# Patient Record
Sex: FEMALE | Race: WHITE | NOT HISPANIC OR LATINO | Employment: FULL TIME | URBAN - METROPOLITAN AREA
[De-identification: names, ages, dates, MRNs, and addresses within clinical notes are randomized per-mention and may not be internally consistent; named-entity substitution may affect disease eponyms.]

---

## 2017-08-04 ENCOUNTER — OFFICE VISIT (OUTPATIENT)
Dept: URGENT CARE | Facility: CLINIC | Age: 57
End: 2017-08-04
Payer: COMMERCIAL

## 2017-08-04 VITALS
OXYGEN SATURATION: 98 % | BODY MASS INDEX: 29.23 KG/M2 | WEIGHT: 165 LBS | HEIGHT: 63 IN | DIASTOLIC BLOOD PRESSURE: 90 MMHG | TEMPERATURE: 98 F | HEART RATE: 89 BPM | SYSTOLIC BLOOD PRESSURE: 128 MMHG

## 2017-08-04 DIAGNOSIS — S30.0XXA COCCYX CONTUSION, INITIAL ENCOUNTER: ICD-10-CM

## 2017-08-04 DIAGNOSIS — S20.229A BACK CONTUSION, UNSPECIFIED LATERALITY, INITIAL ENCOUNTER: Primary | ICD-10-CM

## 2017-08-04 PROCEDURE — 99203 OFFICE O/P NEW LOW 30 MIN: CPT | Mod: S$GLB,,,

## 2017-08-04 PROCEDURE — 3008F BODY MASS INDEX DOCD: CPT | Mod: S$GLB,,,

## 2017-08-04 NOTE — PROGRESS NOTES
"Subjective:       Patient ID: Claudia Martínez is a 57 y.o. female.    Chief Complaint: Fall    NEW INJURY (DOI 7/24/2017) PT REPORTS INSPECTING A WATER LAB WHEN SHE STEPPED BACK HER FOOT HIT A STEP CAUSING HER TO LOSE HER BALANCE. PT FELL BACK INTO A SITTING POSITION ON HER PURSE. PT STATES SHE WAS HOLDING A CAMERA, SO SHE REMEMBERS CLUTCHING IT REALLY TIGHT TO PROTECT IT FROM FALLING. PT REPORTS NOT FEELING ANY EFFECTS UNTIL YESTERDAY. SHE C/O BILATERAL ARMS AND LEGS, "WEIRD TIGHT" SENSATIONS, AND CENTER LOW BACK DISCOMFORT. SHE DENIES PAIN. SHE STATES IT'S MORE OF AN "IRRITATION" Injury occurred on July 24, symptoms started in past 2 days. Asymptomatic today except for tailbone. Past history of L 4-5 disc herniation cared for by Dr Diana in 2007. No LBP since then.      Fall   The accident occurred more than 1 week ago. The fall occurred while walking. She fell from an unknown height. She landed on hard floor. There was no blood loss. The point of impact was the buttocks. The pain is present in the left upper arm, right upper arm, left upper leg, right upper leg and back. The pain is at a severity of 0/10. The patient is experiencing no pain. The symptoms are aggravated by sitting. Pertinent negatives include no abdominal pain, bowel incontinence, hematuria or numbness. She has tried NSAID for the symptoms. The treatment provided moderate relief.     Review of Systems   Constitution: Negative for malaise/fatigue.   Skin: Negative for rash.   Musculoskeletal: Positive for back pain and muscle cramps. Negative for muscle weakness and stiffness.   Gastrointestinal: Negative for abdominal pain and bowel incontinence.   Genitourinary: Negative for bladder incontinence, dysuria, hematuria and urgency.   Neurological: Negative for disturbances in coordination and numbness.       Objective:      Physical Exam   Constitutional: She is oriented to person, place, and time. She appears well-developed and well-nourished. She is " cooperative.  Non-toxic appearance. She does not appear ill. No distress.   HENT:   Head: Normocephalic and atraumatic.   Right Ear: Hearing, tympanic membrane, external ear and ear canal normal.   Left Ear: Hearing, tympanic membrane, external ear and ear canal normal.   Nose: Nose normal. No mucosal edema, rhinorrhea or nasal deformity. No epistaxis. Right sinus exhibits no maxillary sinus tenderness and no frontal sinus tenderness. Left sinus exhibits no maxillary sinus tenderness and no frontal sinus tenderness.   Mouth/Throat: Uvula is midline, oropharynx is clear and moist and mucous membranes are normal. No trismus in the jaw. Normal dentition. No uvula swelling. No posterior oropharyngeal erythema.   Eyes: Conjunctivae and lids are normal. Right eye exhibits no discharge. Left eye exhibits no discharge. No scleral icterus.   Sclera clear bilat   Neck: Trachea normal, normal range of motion, full passive range of motion without pain and phonation normal. Neck supple.   Cardiovascular: Normal rate, regular rhythm, normal heart sounds, intact distal pulses and normal pulses.    Pulmonary/Chest: Effort normal and breath sounds normal. No respiratory distress.   Abdominal: Soft. Normal appearance and bowel sounds are normal. She exhibits no distension, no pulsatile midline mass and no mass. There is no tenderness.   Musculoskeletal: Normal range of motion. She exhibits no edema or deformity.        Lumbar back: She exhibits tenderness (Tender coccyx). She exhibits no bony tenderness, no swelling, no edema and no spasm.   ROM normal:  Flex  Ext  Rotation R and L  Side bending R and L  Neg Spurling's sign    Sensory intact to pin prick  Motor 5/5 bilaterally  DTR's 2+ biceps, triceps    Full ROM in Flex, ext, R and   Neg SLR bilaterally  Neg Emily bilaterally  Able to sit up and reverse sit up    DTR's 2+ bilaterally in the Patellar and Achilles Tendon areas  Sensory exam intact to pin prick bilaterally  Motor exam  5/5 bilaterally    Waddells:    Tenderness Neg  Simulation Neg  Distraction Neg  Regional Neg  Overreaction Neg   Neurological: She is alert and oriented to person, place, and time. She exhibits normal muscle tone. Coordination normal.   Skin: Skin is warm, dry and intact. She is not diaphoretic. No pallor.   Psychiatric: She has a normal mood and affect. Her speech is normal and behavior is normal. Judgment and thought content normal. Cognition and memory are normal.   Nursing note and vitals reviewed.      Assessment:       1. Back contusion, unspecified laterality, initial encounter    2. Coccyx contusion, initial encounter        Plan:       Warm soaks  Follow up as needed  OTC Ibuprofen as needed

## 2017-08-07 ENCOUNTER — OFFICE VISIT (OUTPATIENT)
Dept: URGENT CARE | Facility: CLINIC | Age: 57
End: 2017-08-07
Payer: COMMERCIAL

## 2017-08-07 VITALS
SYSTOLIC BLOOD PRESSURE: 128 MMHG | OXYGEN SATURATION: 98 % | HEART RATE: 97 BPM | TEMPERATURE: 98 F | DIASTOLIC BLOOD PRESSURE: 78 MMHG

## 2017-08-07 DIAGNOSIS — S20.229D BACK CONTUSION, UNSPECIFIED LATERALITY, SUBSEQUENT ENCOUNTER: Primary | ICD-10-CM

## 2017-08-07 DIAGNOSIS — M47.816 LUMBAR SPONDYLOSIS: ICD-10-CM

## 2017-08-07 PROCEDURE — 3008F BODY MASS INDEX DOCD: CPT | Mod: S$GLB,,,

## 2017-08-07 PROCEDURE — 99213 OFFICE O/P EST LOW 20 MIN: CPT | Mod: S$GLB,,,

## 2017-08-07 RX ORDER — NAPROXEN 500 MG/1
500 TABLET ORAL 2 TIMES DAILY WITH MEALS
Qty: 30 TABLET | Refills: 2 | Status: SHIPPED | OUTPATIENT
Start: 2017-08-07 | End: 2018-08-07

## 2017-08-07 NOTE — PROGRESS NOTES
Subjective:       Patient ID: Claudia Martínez is a 57 y.o. female.    Chief Complaint: Back Pain    F/U LOW BACK PAIN (DOI 7/24/2017). Woke up with spasms in lower back and coccygeal area. Took Ibuprofen with some relief.       Back Pain   The current episode started more than 1 month ago. The problem occurs constantly. The problem is unchanged. The pain is present in the lumbar spine. The quality of the pain is described as aching. The pain is mild. The pain is worse during the night. Pertinent negatives include no abdominal pain, bladder incontinence, bowel incontinence, dysuria or numbness.     Review of Systems   Constitution: Negative for malaise/fatigue.   Skin: Negative for rash.   Musculoskeletal: Positive for back pain. Negative for muscle cramps, muscle weakness and stiffness.   Gastrointestinal: Negative for abdominal pain and bowel incontinence.   Genitourinary: Negative for bladder incontinence, dysuria, hematuria and urgency.   Neurological: Negative for disturbances in coordination and numbness.       Objective:      Physical Exam   Constitutional: She is oriented to person, place, and time. She appears well-developed and well-nourished. She is cooperative.  Non-toxic appearance. She does not appear ill. No distress.   HENT:   Head: Normocephalic and atraumatic.   Right Ear: Hearing, tympanic membrane, external ear and ear canal normal.   Left Ear: Hearing, tympanic membrane, external ear and ear canal normal.   Nose: Nose normal. No mucosal edema, rhinorrhea or nasal deformity. No epistaxis. Right sinus exhibits no maxillary sinus tenderness and no frontal sinus tenderness. Left sinus exhibits no maxillary sinus tenderness and no frontal sinus tenderness.   Mouth/Throat: Uvula is midline, oropharynx is clear and moist and mucous membranes are normal. No trismus in the jaw. Normal dentition. No uvula swelling. No posterior oropharyngeal erythema.   Eyes: Conjunctivae and lids are normal. Right eye  exhibits no discharge. Left eye exhibits no discharge. No scleral icterus.   Sclera clear bilat   Neck: Trachea normal, normal range of motion, full passive range of motion without pain and phonation normal. Neck supple.   Cardiovascular: Normal rate, regular rhythm, normal heart sounds, intact distal pulses and normal pulses.    Pulmonary/Chest: Effort normal and breath sounds normal. No respiratory distress.   Abdominal: Soft. Normal appearance and bowel sounds are normal. She exhibits no distension, no pulsatile midline mass and no mass. There is no tenderness.   Musculoskeletal: Normal range of motion. She exhibits no edema or deformity.   Neurological: She is alert and oriented to person, place, and time. She exhibits normal muscle tone. Coordination normal.   Skin: Skin is warm, dry and intact. She is not diaphoretic. No pallor.   Psychiatric: She has a normal mood and affect. Her speech is normal and behavior is normal. Judgment and thought content normal. Cognition and memory are normal.   Nursing note and vitals reviewed.      Assessment:       1. Back contusion, unspecified laterality, subsequent encounter    2. Lumbar spondylosis        Plan:           Medications Ordered This Encounter      naproxen (NAPROSYN) 500 MG tablet          Sig: Take 1 tablet (500 mg total) by mouth 2 (two) times daily with meals.          Dispense:  30 tablet          Refill:  2      Restrictions: Sedentary work only, Avoid frequent bending/lifting/twisting, Avoid climbing/kneeling/squatting

## 2017-08-14 ENCOUNTER — OFFICE VISIT (OUTPATIENT)
Dept: URGENT CARE | Facility: CLINIC | Age: 57
End: 2017-08-14
Payer: COMMERCIAL

## 2017-08-14 DIAGNOSIS — S20.229D BACK CONTUSION, UNSPECIFIED LATERALITY, SUBSEQUENT ENCOUNTER: Primary | ICD-10-CM

## 2017-08-14 DIAGNOSIS — M47.816 LUMBAR SPONDYLOSIS: ICD-10-CM

## 2017-08-14 PROCEDURE — 3008F BODY MASS INDEX DOCD: CPT | Mod: S$GLB,,, | Performed by: PREVENTIVE MEDICINE

## 2017-08-14 PROCEDURE — 99213 OFFICE O/P EST LOW 20 MIN: CPT | Mod: S$GLB,,, | Performed by: PREVENTIVE MEDICINE

## 2017-08-14 NOTE — PROGRESS NOTES
Subjective:       Patient ID: Claudia Martínez is a 57 y.o. female.    Chief Complaint: Back Pain (no pain)    Patient states that she has no pain/spasms or discomfort      Back Pain   This is a new problem. The current episode started 1 to 4 weeks ago. The pain is at a severity of 0/10. The patient is experiencing no pain. Pertinent negatives include no abdominal pain, bladder incontinence, chest pain, dysuria, fever, headaches, numbness or weakness. The treatment provided significant relief.     Review of Systems   Constitution: Negative for chills, fever and weakness.   HENT: Negative for congestion, headaches and sore throat.    Eyes: Negative for blurred vision and pain.   Cardiovascular: Negative for chest pain, palpitations and syncope.   Respiratory: Negative for cough, shortness of breath and wheezing.    Skin: Negative for dry skin, itching and rash.   Musculoskeletal: Positive for back pain. Negative for muscle weakness and stiffness.   Gastrointestinal: Negative for abdominal pain, constipation, diarrhea, nausea and vomiting.   Genitourinary: Negative for bladder incontinence, dysuria and hematuria.   Neurological: Negative for dizziness and numbness.       Objective:      Physical Exam    Assessment:       No diagnosis found.    Plan:                   ***

## 2017-08-14 NOTE — LETTER
[facility] [place]  [address], [address]  [place] [address]  Phone: [phone]  Fax: [phone]    Pt Name: Claudia Martínez     Employee ID:  Date of First Treatment: 08/14/2017   Company: [employer]            Appointment Time: 02:15 PM Arrived:  2:30 PM CDT   Physician: Nain Bryant MD Time Out: 10:00 AM           Office Treatment: Claudia was seen today for back pain.    Diagnoses and all orders for this visit:    Back contusion, unspecified laterality, subsequent encounter    Lumbar spondylosis       Patient Instructions: Daily home exercises/warm soaks    Restrictions: Regular Duty, Discharged from Occupational Health       Return Appointment: Discharged

## 2017-08-14 NOTE — PROGRESS NOTES
Subjective:       Patient ID: Claudia Martínez is a 57 y.o. female.    Chief Complaint: Back Pain (no pain)    Patient has no complaints of back pain/spasms or discomfort      Back Pain   This is a new problem. The current episode started 1 to 4 weeks ago. The pain is at a severity of 0/10. The patient is experiencing no pain. The treatment provided significant relief.     Review of Systems   Musculoskeletal: Positive for back pain.   All other systems reviewed and are negative.      Objective:      Physical Exam   Constitutional: She appears well-developed and well-nourished.   HENT:   Head: Normocephalic.   Eyes: Pupils are equal, round, and reactive to light.   Neck: Normal range of motion.   Cardiovascular: Normal rate.    Pulmonary/Chest: Effort normal.   Musculoskeletal:        Lumbar back: She exhibits normal range of motion, no tenderness, no bony tenderness, no swelling, no edema, no deformity, no laceration, no pain, no spasm and normal pulse.   Neurological: She is alert.   No focal neurologic deficits   Skin: Skin is warm.   Psychiatric: She has a normal mood and affect.   Nursing note and vitals reviewed.      Assessment:       1. Back contusion, unspecified laterality, subsequent encounter    2. Lumbar spondylosis        Plan:            Patient Instructions: Daily home exercises/warm soaks   Restrictions: Regular Duty, Discharged from Occupational Health      Return if symptoms worsen or fail to improve.

## 2017-11-15 ENCOUNTER — OFFICE VISIT (OUTPATIENT)
Dept: URGENT CARE | Facility: CLINIC | Age: 57
End: 2017-11-15
Payer: COMMERCIAL

## 2017-11-15 VITALS
RESPIRATION RATE: 18 BRPM | DIASTOLIC BLOOD PRESSURE: 78 MMHG | HEIGHT: 63 IN | WEIGHT: 170 LBS | TEMPERATURE: 99 F | HEART RATE: 83 BPM | SYSTOLIC BLOOD PRESSURE: 122 MMHG | BODY MASS INDEX: 30.12 KG/M2 | OXYGEN SATURATION: 98 %

## 2017-11-15 DIAGNOSIS — J32.9 SINUSITIS, UNSPECIFIED CHRONICITY, UNSPECIFIED LOCATION: Primary | ICD-10-CM

## 2017-11-15 PROCEDURE — 99203 OFFICE O/P NEW LOW 30 MIN: CPT | Mod: S$GLB,,, | Performed by: FAMILY MEDICINE

## 2017-11-15 RX ORDER — METHYLPREDNISOLONE 4 MG/1
TABLET ORAL
Qty: 1 PACKAGE | Refills: 0 | Status: SHIPPED | OUTPATIENT
Start: 2017-11-15 | End: 2017-11-21

## 2017-11-15 RX ORDER — AZITHROMYCIN 250 MG/1
TABLET, FILM COATED ORAL
Qty: 6 TABLET | Refills: 0 | Status: SHIPPED | OUTPATIENT
Start: 2017-11-15

## 2017-11-15 NOTE — PROGRESS NOTES
"Subjective:       Patient ID: Claudia Martínez is a 57 y.o. female.    Vitals:  height is 5' 3" (1.6 m) and weight is 77.1 kg (170 lb). Her temperature is 99.2 °F (37.3 °C). Her blood pressure is 122/78 and her pulse is 83. Her respiration is 18 and oxygen saturation is 98%.     Chief Complaint: Sinus Problem    Sinus Problem   This is a new problem. The current episode started in the past 7 days. The problem is unchanged. There has been no fever. She is experiencing no pain. Associated symptoms include congestion, a hoarse voice and swollen glands. Pertinent negatives include no chills, coughing, ear pain, headaches, shortness of breath or sore throat. Past treatments include oral decongestants and acetaminophen. The treatment provided mild relief.     Review of Systems   Constitution: Positive for decreased appetite and malaise/fatigue. Negative for chills and fever.   HENT: Positive for congestion and hoarse voice. Negative for ear pain and sore throat.    Eyes: Negative for discharge and redness.   Cardiovascular: Negative for chest pain, dyspnea on exertion and leg swelling.   Respiratory: Negative for cough, shortness of breath, sputum production and wheezing.    Musculoskeletal: Negative for myalgias.   Gastrointestinal: Negative for abdominal pain and nausea.   Neurological: Negative for headaches.   All other systems reviewed and are negative.      Objective:      Physical Exam   Constitutional: She is oriented to person, place, and time. She appears well-developed and well-nourished.   HENT:   Head: Normocephalic and atraumatic.   Right Ear: External ear normal.   Left Ear: External ear normal.   Bilateral frontal sinus tenderness.   Eyes: EOM are normal. Pupils are equal, round, and reactive to light.   Neck: Normal range of motion. Neck supple. No JVD present. No tracheal deviation present. No thyromegaly present.   Cardiovascular: Normal rate, regular rhythm and normal heart sounds.  Exam reveals no gallop " and no friction rub.    No murmur heard.  Pulmonary/Chest: Breath sounds normal. No respiratory distress. She has no wheezes. She has no rales. She exhibits no tenderness.   Abdominal: Soft. Bowel sounds are normal. She exhibits no distension and no mass. There is no tenderness. There is no rebound and no guarding. No hernia.   Musculoskeletal: Normal range of motion. She exhibits no edema, tenderness or deformity.   Lymphadenopathy:     She has no cervical adenopathy.   Neurological: She is alert and oriented to person, place, and time. She displays normal reflexes. No cranial nerve deficit. She exhibits normal muscle tone. Coordination normal.   Skin: Skin is warm. Capillary refill takes less than 2 seconds. No rash noted. No erythema. No pallor.   Psychiatric: She has a normal mood and affect. Her behavior is normal. Judgment and thought content normal.   Vitals reviewed.      Assessment:       1. Sinusitis, unspecified chronicity, unspecified location        Plan:         Sinusitis, unspecified chronicity, unspecified location  -     azithromycin (Z-EDGAR) 250 MG tablet; Take 2 tablets by mouth x 1 for day 1 Then take 1 tablet by mouth daily for day 2 - 5  Dispense: 6 tablet; Refill: 0  -     methylPREDNISolone (MEDROL DOSEPACK) 4 mg tablet; use as directed  Dispense: 1 Package; Refill: 0      Follow up with your doctor in a few days as needed.  Return to the urgent care or go to the ER if symptoms get worse.    Wilder Veliz MD

## 2017-11-15 NOTE — PATIENT INSTRUCTIONS
Follow up with your doctor in a few days as needed.  Return to the urgent care or go to the ER if symptoms get worse.    Wilder Veliz MD

## 2017-11-15 NOTE — LETTER
November 15, 2017      [facility] [place]  [address]  [place] [address]  Phone: [phone]  Fax: [phone]       Patient: Claudia Martínez   YOB: 1960  Date of Visit: 11/15/2017    To Whom It May Concern:    Marissa Martínez  was at [facility] on 11/15/2017. She may return to work/school on 11/17/17 with no restrictions. If you have any questions or concerns, or if I can be of further assistance, please do not hesitate to contact me.    Sincerely,          Ginger Maddox, RT

## 2018-04-02 ENCOUNTER — OFFICE VISIT (OUTPATIENT)
Dept: URGENT CARE | Facility: CLINIC | Age: 58
End: 2018-04-02
Payer: COMMERCIAL

## 2018-04-02 VITALS
OXYGEN SATURATION: 98 % | WEIGHT: 170 LBS | TEMPERATURE: 99 F | HEART RATE: 71 BPM | HEIGHT: 63 IN | DIASTOLIC BLOOD PRESSURE: 65 MMHG | RESPIRATION RATE: 16 BRPM | SYSTOLIC BLOOD PRESSURE: 116 MMHG | BODY MASS INDEX: 30.12 KG/M2

## 2018-04-02 DIAGNOSIS — J01.20 ACUTE NON-RECURRENT ETHMOIDAL SINUSITIS: Primary | ICD-10-CM

## 2018-04-02 PROCEDURE — 99214 OFFICE O/P EST MOD 30 MIN: CPT | Mod: SA,S$GLB,, | Performed by: NURSE PRACTITIONER

## 2018-04-02 RX ORDER — AMOXICILLIN AND CLAVULANATE POTASSIUM 875; 125 MG/1; MG/1
1 TABLET, FILM COATED ORAL 2 TIMES DAILY
Qty: 20 TABLET | Refills: 0 | Status: SHIPPED | OUTPATIENT
Start: 2018-04-02 | End: 2018-04-12

## 2018-04-02 NOTE — PATIENT INSTRUCTIONS
Please drink plenty of fluids.  Please get plenty of rest.  Please return here or go to the Emergency Department for any concerns or worsening of condition.  If you were given wait & see antibiotics, please wait 3-5 days before taking them, and only take them if your symptoms have worsened or not improved.  If you do begin taking the antibiotics, please take them to completion.  If you were prescribed antibiotics, please take them to completion.  If you were prescribed a narcotic medication, do not drive or operate heavy equipment or machinery while taking these medications.  If you do not have Hypertension or any history of palpitations, it is ok to take over the counter Sudafed or Mucinex D or Allegra-D or Claritin-D or Zyrtec-D.  If you do take one of the above, it is ok to combine that with plain over the counter Mucinex or Allegra or Claritin or Zyrtec.  If for example you are taking Zyrtec -D, you can combine that with Mucinex, but not Mucinex-D.  If you are taking Mucinex-D, you can combine that with plain Allegra or Claritin or Zyrtec.   If you do have Hypertension or palpitations, it is safe to take Coricidin HBP for relief of sinus symptoms.  We recommend you take over the counter Flonase (Fluticasone) or another nasally inhaled steroid unless you are already taking one.  Nasal irrigation with a saline spray or Netti Pot like device per their directions is also recommended.  If not allergic, please take over the counter Tylenol (Acetaminophen) and/or Motrin (Ibuprofen) as directed for control of pain and/or fever.  Please follow up with your primary care doctor or specialist as needed.    If you  smoke, please stop smoking.  Sinusitis (No Antibiotics)    The sinuses are air-filled spaces within the bones of the face. They connect to the inside of the nose. Sinusitis is an inflammation of the tissue lining the sinus cavity. Sinus inflammation can occur during a cold. It can also be due to allergies to  pollens and other particles in the air. It can cause symptoms such as sinus congestion, headache, sore throat, facial swelling and fullness. It may also cause a low-grade fever. No infection is present, and no antibiotic treatment is needed.  Home care  · Drink plenty of water, hot tea, and other liquids. This may help thin mucus. It also may promote sinus drainage.  · Heat may help soothe painful areas of the face. Use a towel soaked in hot water. Or,  the shower and direct the hot spray onto your face. Using a vaporizer along with a menthol rub at night may also help.   · An expectorant containing guaifenesin may help thin the mucus and promote drainage from the sinuses.  · Over-the-counter decongestants may be used unless a similar medicine was prescribed. Nasal sprays work the fastest. Use one that contains phenylephrine or oxymetazoline. First blow the nose gently. Then use the spray. Do not use these medicines more often than directed on the label or symptoms may get worse. You may also use tablets containing pseudoephedrine. Avoid products that combine ingredients, because side effects may be increased. Read labels. You can also ask the pharmacist for help. (NOTE: Persons with high blood pressure should not use decongestants. They can raise blood pressure.)  · Over-the-counter antihistamines may help if allergies contributed to your sinusitis.    · Use acetaminophen or ibuprofen to control pain, unless another pain medicine was prescribed. (If you have chronic liver or kidney disease or ever had a stomach ulcer, talk with your doctor before using these medicines. Aspirin should never be used in anyone under 18 years of age who is ill with a fever. It may cause severe liver damage.)  · Use nasal rinses or irrigation as instructed by your health care provider.  · Don't smoke. This can worsen symptoms.  Follow-up care  Follow up with your healthcare provider or our staff if you are not improving within  the next week.  When to seek medical advice  Call your healthcare provider if any of these occur:  · Green or yellow discharge from the nose or into the throat  · Facial pain or headache becoming more severe  · Stiff neck  · Unusual drowsiness or confusion  · Swelling of the forehead or eyelids  · Vision problems, including blurred or double vision  · Fever of 100.4ºF (38ºC) or higher, or as directed by your healthcare provider  · Seizure  · Breathing problems  · Symptoms not resolving within 10 days  Date Last Reviewed: 4/13/2015  © 6402-3206 [employer]. [address] [place] [address]. All rights reserved. This information is not intended as a substitute for professional medical care. Always follow your healthcare professional's instructions.

## 2018-04-02 NOTE — PROGRESS NOTES
"Subjective:       Patient ID: Claudia Martínez is a 58 y.o. female.    Vitals:  height is 5' 3" (1.6 m) and weight is 77.1 kg (170 lb). Her temperature is 99 °F (37.2 °C). Her blood pressure is 116/65 and her pulse is 71. Her respiration is 16 and oxygen saturation is 98%.     Chief Complaint: Nasal Congestion (pt said she has been congested for 2 weeks)    Pt said she has had symptoms for over  2 weeks. She has taken mucinex, sudafed, and antihistamines, with no relief.      Review of Systems   Constitution: Negative for chills, fever and malaise/fatigue.   HENT: Positive for congestion. Negative for ear pain, hoarse voice and sore throat.    Eyes: Negative for discharge and redness.   Cardiovascular: Negative for chest pain, dyspnea on exertion and leg swelling.   Respiratory: Positive for cough and sputum production. Negative for shortness of breath and wheezing.    Musculoskeletal: Negative for myalgias.   Gastrointestinal: Negative for abdominal pain and nausea.   Neurological: Negative for headaches.       Objective:      Physical Exam   Constitutional: She is oriented to person, place, and time. She appears well-developed and well-nourished. She is cooperative.  Non-toxic appearance. She does not appear ill. No distress.   HENT:   Head: Normocephalic and atraumatic.   Right Ear: Hearing, tympanic membrane, external ear and ear canal normal.   Left Ear: Hearing, tympanic membrane, external ear and ear canal normal.   Nose: Mucosal edema (WITH ETHMOIDAL TENDERNESS) present. No rhinorrhea or nasal deformity. No epistaxis. Right sinus exhibits no maxillary sinus tenderness and no frontal sinus tenderness. Left sinus exhibits no maxillary sinus tenderness and no frontal sinus tenderness.   Mouth/Throat: Uvula is midline and mucous membranes are normal. No trismus in the jaw. Normal dentition. No uvula swelling. Posterior oropharyngeal erythema (MILD WITH + PND) present.   Eyes: Conjunctivae and lids are normal. No " scleral icterus.   Sclera clear bilat   Neck: Trachea normal, full passive range of motion without pain and phonation normal. Neck supple.   Cardiovascular: Normal rate, regular rhythm, normal heart sounds, intact distal pulses and normal pulses.    Pulmonary/Chest: Effort normal and breath sounds normal. No respiratory distress.   Abdominal: Soft. Normal appearance and bowel sounds are normal. She exhibits no distension. There is no tenderness.   Musculoskeletal: Normal range of motion. She exhibits no edema or deformity.   Lymphadenopathy:     She has cervical adenopathy.        Right cervical: Superficial cervical adenopathy present.        Left cervical: Superficial cervical adenopathy present.   Neurological: She is alert and oriented to person, place, and time. She exhibits normal muscle tone. Coordination normal.   Skin: Skin is warm, dry and intact. She is not diaphoretic. No pallor.   Psychiatric: She has a normal mood and affect. Her speech is normal and behavior is normal. Judgment and thought content normal. Cognition and memory are normal.   Nursing note and vitals reviewed.      Assessment:       1. Acute non-recurrent ethmoidal sinusitis        Plan:         Acute non-recurrent ethmoidal sinusitis  -     amoxicillin-clavulanate 875-125mg (AUGMENTIN) 875-125 mg per tablet; Take 1 tablet by mouth 2 (two) times daily.  Dispense: 20 tablet; Refill: 0      Patient Instructions   Please drink plenty of fluids.  Please get plenty of rest.  Please return here or go to the Emergency Department for any concerns or worsening of condition.  If you were given wait & see antibiotics, please wait 3-5 days before taking them, and only take them if your symptoms have worsened or not improved.  If you do begin taking the antibiotics, please take them to completion.  If you were prescribed antibiotics, please take them to completion.  If you were prescribed a narcotic medication, do not drive or operate heavy equipment  or machinery while taking these medications.  If you do not have Hypertension or any history of palpitations, it is ok to take over the counter Sudafed or Mucinex D or Allegra-D or Claritin-D or Zyrtec-D.  If you do take one of the above, it is ok to combine that with plain over the counter Mucinex or Allegra or Claritin or Zyrtec.  If for example you are taking Zyrtec -D, you can combine that with Mucinex, but not Mucinex-D.  If you are taking Mucinex-D, you can combine that with plain Allegra or Claritin or Zyrtec.   If you do have Hypertension or palpitations, it is safe to take Coricidin HBP for relief of sinus symptoms.  We recommend you take over the counter Flonase (Fluticasone) or another nasally inhaled steroid unless you are already taking one.  Nasal irrigation with a saline spray or Netti Pot like device per their directions is also recommended.  If not allergic, please take over the counter Tylenol (Acetaminophen) and/or Motrin (Ibuprofen) as directed for control of pain and/or fever.  Please follow up with your primary care doctor or specialist as needed.    If you  smoke, please stop smoking.  Sinusitis (No Antibiotics)    The sinuses are air-filled spaces within the bones of the face. They connect to the inside of the nose. Sinusitis is an inflammation of the tissue lining the sinus cavity. Sinus inflammation can occur during a cold. It can also be due to allergies to pollens and other particles in the air. It can cause symptoms such as sinus congestion, headache, sore throat, facial swelling and fullness. It may also cause a low-grade fever. No infection is present, and no antibiotic treatment is needed.  Home care  · Drink plenty of water, hot tea, and other liquids. This may help thin mucus. It also may promote sinus drainage.  · Heat may help soothe painful areas of the face. Use a towel soaked in hot water. Or,  the shower and direct the hot spray onto your face. Using a vaporizer along  with a menthol rub at night may also help.   · An expectorant containing guaifenesin may help thin the mucus and promote drainage from the sinuses.  · Over-the-counter decongestants may be used unless a similar medicine was prescribed. Nasal sprays work the fastest. Use one that contains phenylephrine or oxymetazoline. First blow the nose gently. Then use the spray. Do not use these medicines more often than directed on the label or symptoms may get worse. You may also use tablets containing pseudoephedrine. Avoid products that combine ingredients, because side effects may be increased. Read labels. You can also ask the pharmacist for help. (NOTE: Persons with high blood pressure should not use decongestants. They can raise blood pressure.)  · Over-the-counter antihistamines may help if allergies contributed to your sinusitis.    · Use acetaminophen or ibuprofen to control pain, unless another pain medicine was prescribed. (If you have chronic liver or kidney disease or ever had a stomach ulcer, talk with your doctor before using these medicines. Aspirin should never be used in anyone under 18 years of age who is ill with a fever. It may cause severe liver damage.)  · Use nasal rinses or irrigation as instructed by your health care provider.  · Don't smoke. This can worsen symptoms.  Follow-up care  Follow up with your healthcare provider or our staff if you are not improving within the next week.  When to seek medical advice  Call your healthcare provider if any of these occur:  · Green or yellow discharge from the nose or into the throat  · Facial pain or headache becoming more severe  · Stiff neck  · Unusual drowsiness or confusion  · Swelling of the forehead or eyelids  · Vision problems, including blurred or double vision  · Fever of 100.4ºF (38ºC) or higher, or as directed by your healthcare provider  · Seizure  · Breathing problems  · Symptoms not resolving within 10 days  Date Last Reviewed: 4/13/2015  ©  8466-4625 [employer]. [address] [place] [address], [place] [address]. All rights reserved. This information is not intended as a substitute for professional medical care. Always follow your healthcare professional's instructions.

## 2018-04-03 ENCOUNTER — TELEPHONE (OUTPATIENT)
Dept: URGENT CARE | Facility: CLINIC | Age: 58
End: 2018-04-03

## 2018-04-03 DIAGNOSIS — J32.9 SINUSITIS, UNSPECIFIED CHRONICITY, UNSPECIFIED LOCATION: Primary | ICD-10-CM

## 2018-04-03 RX ORDER — METHYLPREDNISOLONE 4 MG/1
TABLET ORAL
Qty: 1 PACKAGE | Refills: 0 | Status: SHIPPED | OUTPATIENT
Start: 2018-04-03 | End: 2018-04-09

## 2018-04-03 NOTE — TELEPHONE ENCOUNTER
Pt call and complaint that her sinusitis symptoms has not improve with with only Augmentin. Pt has no history of diabetes or glaucoma.  Will send medrol pack to pt pharmacy.  Continue Augmentin.    Wilder Veliz MD

## 2018-04-05 ENCOUNTER — OFFICE VISIT (OUTPATIENT)
Dept: URGENT CARE | Facility: CLINIC | Age: 58
End: 2018-04-05
Payer: COMMERCIAL

## 2018-04-05 VITALS
TEMPERATURE: 98 F | HEART RATE: 83 BPM | DIASTOLIC BLOOD PRESSURE: 66 MMHG | WEIGHT: 170 LBS | BODY MASS INDEX: 30.12 KG/M2 | OXYGEN SATURATION: 98 % | RESPIRATION RATE: 18 BRPM | HEIGHT: 63 IN | SYSTOLIC BLOOD PRESSURE: 115 MMHG

## 2018-04-05 DIAGNOSIS — J01.90 ACUTE BACTERIAL SINUSITIS: ICD-10-CM

## 2018-04-05 DIAGNOSIS — B96.89 ACUTE BACTERIAL SINUSITIS: ICD-10-CM

## 2018-04-05 DIAGNOSIS — J20.9 ACUTE PURULENT BRONCHITIS: Primary | ICD-10-CM

## 2018-04-05 PROCEDURE — 99213 OFFICE O/P EST LOW 20 MIN: CPT | Mod: S$GLB,,, | Performed by: INTERNAL MEDICINE

## 2018-04-05 RX ORDER — LEVOFLOXACIN 500 MG/1
500 TABLET, FILM COATED ORAL DAILY
Qty: 10 TABLET | Refills: 0 | Status: SHIPPED | OUTPATIENT
Start: 2018-04-05 | End: 2018-04-15

## 2018-04-06 NOTE — PROGRESS NOTES
"Subjective:       Patient ID: Claudia Martínez is a 58 y.o. female.    Vitals:  height is 5' 3" (1.6 m) and weight is 77.1 kg (170 lb). Her temperature is 97.7 °F (36.5 °C). Her blood pressure is 115/66 and her pulse is 83. Her respiration is 18 and oxygen saturation is 98%.     Chief Complaint: URI    Pt seen here on 4/2, pt states she now has chest and head congestion. Pt is concerned about pneumonia. Pt states she is still taking her antibiotic and today started taking the oral steriod that was called in for her      URI    This is a new problem. The current episode started in the past 7 days. The problem has been gradually worsening. Associated symptoms include congestion and coughing. Pertinent negatives include no abdominal pain, chest pain, ear pain, headaches, nausea, sore throat or wheezing.     Review of Systems   Constitution: Negative for chills, fever and malaise/fatigue.   HENT: Positive for congestion. Negative for ear pain, hoarse voice and sore throat.    Eyes: Negative for discharge and redness.   Cardiovascular: Negative for chest pain, dyspnea on exertion and leg swelling.   Respiratory: Positive for cough and sputum production. Negative for shortness of breath and wheezing.    Musculoskeletal: Negative for myalgias.   Gastrointestinal: Negative for abdominal pain and nausea.   Neurological: Negative for headaches.       Objective:      Physical Exam   Constitutional: She appears well-developed and well-nourished.   HENT:   Head: Normocephalic and atraumatic.   Nose: Mucosal edema (green purulent mucous discharge) present. Right sinus exhibits maxillary sinus tenderness. Left sinus exhibits maxillary sinus tenderness.   Eyes: Conjunctivae and EOM are normal. Pupils are equal, round, and reactive to light.   Neck: Normal range of motion. Neck supple.   Cardiovascular: Normal rate and regular rhythm.    Pulmonary/Chest: Effort normal.   Upper airway congestion   Nursing note and vitals reviewed.    "   Assessment:       1. Acute purulent bronchitis    2. Acute bacterial sinusitis        Plan:         Acute purulent bronchitis  -     levoFLOXacin (LEVAQUIN) 500 MG tablet; Take 1 tablet (500 mg total) by mouth once daily.  Dispense: 10 tablet; Refill: 0    Acute bacterial sinusitis  -     levoFLOXacin (LEVAQUIN) 500 MG tablet; Take 1 tablet (500 mg total) by mouth once daily.  Dispense: 10 tablet; Refill: 0

## 2019-01-08 ENCOUNTER — OFFICE VISIT (OUTPATIENT)
Dept: URGENT CARE | Facility: CLINIC | Age: 59
End: 2019-01-08
Payer: COMMERCIAL

## 2019-01-08 VITALS
BODY MASS INDEX: 30.12 KG/M2 | TEMPERATURE: 98 F | HEIGHT: 63 IN | OXYGEN SATURATION: 99 % | SYSTOLIC BLOOD PRESSURE: 111 MMHG | HEART RATE: 76 BPM | DIASTOLIC BLOOD PRESSURE: 65 MMHG | WEIGHT: 170 LBS

## 2019-01-08 DIAGNOSIS — J06.9 VIRAL URI WITH COUGH: Primary | ICD-10-CM

## 2019-01-08 PROCEDURE — 99214 PR OFFICE/OUTPT VISIT, EST, LEVL IV, 30-39 MIN: ICD-10-PCS | Mod: S$GLB,,, | Performed by: NURSE PRACTITIONER

## 2019-01-08 PROCEDURE — 3008F PR BODY MASS INDEX (BMI) DOCUMENTED: ICD-10-PCS | Mod: CPTII,S$GLB,, | Performed by: NURSE PRACTITIONER

## 2019-01-08 PROCEDURE — 3008F BODY MASS INDEX DOCD: CPT | Mod: CPTII,S$GLB,, | Performed by: NURSE PRACTITIONER

## 2019-01-08 PROCEDURE — 99214 OFFICE O/P EST MOD 30 MIN: CPT | Mod: S$GLB,,, | Performed by: NURSE PRACTITIONER

## 2019-01-08 NOTE — PROGRESS NOTES
"Subjective:       Patient ID: Claudia Martínez is a 58 y.o. female.    Vitals:  height is 5' 3" (1.6 m) and weight is 77.1 kg (170 lb). Her oral temperature is 97.7 °F (36.5 °C). Her blood pressure is 111/65 and her pulse is 76. Her oxygen saturation is 99%.     Chief Complaint: URI    Patient presents to clinic today with complaints of upper respiratory issues started 4 days ago.  She claims that her symptoms drastically improved yesterday and she is just here because her boss want her to get checked out.  Denies any or chills.  His her purse symptoms or drastically improving.  She has mild cough cold congestion since Saturday.  Patient did receive her flu shot this year.       URI    This is a new problem. The current episode started in the past 7 days (4 days). The problem has been gradually worsening. There has been no fever. Associated symptoms include congestion, coughing, headaches, a plugged ear sensation and rhinorrhea. Pertinent negatives include no ear pain, nausea, rash, sinus pain, sore throat, vomiting or wheezing. She has tried decongestant for the symptoms. The treatment provided mild relief.       Constitution: Positive for fatigue. Negative for chills, sweating and fever.   HENT: Positive for congestion and postnasal drip. Negative for ear pain, sinus pain, sinus pressure, sore throat and voice change.    Neck: Negative for painful lymph nodes.   Eyes: Negative for eye redness.   Respiratory: Positive for cough and sputum production. Negative for chest tightness, bloody sputum, COPD, shortness of breath, stridor, wheezing and asthma.    Gastrointestinal: Negative for nausea and vomiting.   Musculoskeletal: Negative for muscle ache.   Skin: Negative for rash.   Allergic/Immunologic: Negative for seasonal allergies and asthma.   Neurological: Positive for headaches.   Hematologic/Lymphatic: Negative for swollen lymph nodes.       Objective:      Physical Exam   Constitutional: She is oriented to person, " place, and time. She appears well-developed and well-nourished. She is cooperative.  Non-toxic appearance. She does not appear ill. No distress.   HENT:   Head: Normocephalic and atraumatic.   Right Ear: Hearing, external ear and ear canal normal. Tympanic membrane is bulging.   Left Ear: Hearing, external ear and ear canal normal. Tympanic membrane is bulging.   Nose: Mucosal edema and rhinorrhea present. No nasal deformity. No epistaxis. Right sinus exhibits no maxillary sinus tenderness and no frontal sinus tenderness. Left sinus exhibits no maxillary sinus tenderness and no frontal sinus tenderness.   Mouth/Throat: Uvula is midline and mucous membranes are normal. No trismus in the jaw. Normal dentition. No uvula swelling. Posterior oropharyngeal erythema present.   Eyes: Conjunctivae and lids are normal. No scleral icterus.   Sclera clear bilat   Neck: Trachea normal, full passive range of motion without pain and phonation normal. Neck supple.   Cardiovascular: Normal rate, regular rhythm, normal heart sounds, intact distal pulses and normal pulses.   Pulmonary/Chest: Effort normal and breath sounds normal. No respiratory distress.   Abdominal: Soft. Normal appearance and bowel sounds are normal. She exhibits no distension. There is no tenderness.   Musculoskeletal: Normal range of motion. She exhibits no edema or deformity.   Neurological: She is alert and oriented to person, place, and time. She exhibits normal muscle tone. Coordination normal.   Skin: Skin is warm, dry and intact. She is not diaphoretic. No pallor.   Psychiatric: She has a normal mood and affect. Her speech is normal and behavior is normal. Judgment and thought content normal. Cognition and memory are normal.   Nursing note and vitals reviewed.      Assessment:       1. Viral URI with cough        Plan:         Viral URI with cough    -Flonase daily.  -Claritin or Zyrtec daily.  Patient Instructions   A cold is caused by a virus that can  settle in your nose, throat or lungs. This causes  a runny or stuffy nose and sneezing. You may also have a sore throat, cough, headache, fever and muscle aches. Different cold viruses last different lengths  of time, but the average time is 2 to 14 days.    Seek immediate medical care if you develop fever, chest pain, or shortness of breath.     Treatment  There is no cure for the common cold.     Antibiotics may be used to treat signs of a secondary infection, but they do not treat  the cold virus. Try these tips to  keep yourself comfortable:  -Get plenty of rest.  -Drink plenty of fluids, at least 8 large glasses of fluid a day. Good fluidchoices are water, fruit juices high in Vitamin C, tea, gelatin, or broths and soups. These help to keep mucus thin and ease congestion.  -Use salt water gargle, cough drops or throat sprays to relieve throat pain. Mi ¼ to ½ teaspoon of salt in 1 cup of warm water for a salt water gargle  solution.  -Use petroleum jelly or lip balm around lips and nose to prevent chapping.  -Use saline nose drops or spray to help ease congestion.    Over the Counter (OTC) Medicines:  Take over the counter medicines as needed to ease your signs.  Read labels carefully.  Use a product that treats only the signs that you have. Ask your pharmacist  for recommendations. Be sure to ask about possible interactions with other  medicines you are taking.  Common medicines used to treat signs of a cold include:    #Antihistamines that dry secretions in your nose and lungs. Some of these  may cause you to feel drowsy. Talk to your pharmacist before use if you  have glaucoma or an enlarged prostate.  Names of some medicines in this group include:  - Diphenhydramine  - Brompheniramine  - Chlorpheniramine  - Clemastine    # Decongestants that tighten blood vessels in your nose to decrease  stuffiness and pressure. Use nasal spray decongestants for up to three days  only. Longer use can make congestion worse.  Talk to your pharmacist  before use if you have high blood pressure, heart disease, diabetes or an  enlarged prostate.    Names of some medicines in this group include:  - Pseudoephedrine (Sudafed)- kept behind the counter and requires identification  to purchase in limited quantities because it can be used to make illegal  drugs  - Phenylephrine  - Oxymetazoline nasal spray (Afrin)  - Cough suppressant, also called antitussive, such as dextromethorphan.  This medicine decreases your reflex and sensitivity to cough. This  medicine may be kept behind the pharmacy counter for purchase.  - Expectorant, sometimes called mucolytic, such as guaifenesin (mucinex). This  medicine thins mucus secretions in the lungs to make it easier for you to  cough up and out. (Be sure to drink plenty of fluids when taking this medication)  Cold and cough medicines often contain more than one type of medicine.  Ask the pharmacist for help to confirm that you are not using more than one  product with the same or similar ingredient. For example, some cold and  cough medicines have acetaminophen or ibuprofen in them to help lower a  fever or ease muscle aches. Do not take extra acetaminophen (Tylenol) or  ibuprofen (Advil, Motrin) if the cold or cough medicine has it as an  ingredient. Too much medicine could be harmful.    Take the correct dose as listed on the package. Do not take more than  recommended.    Use a Humidifier:  A cool mist humidifier can make breathing easier by thinning mucus. Do not use  a steam humidifier as hot water can cause burns if spilled.  Place the humidifier a few feet from the bed. Drain and clean each day with  soap and water to prevent bacteria and mold from growing.  Indoor humidity should not be above 50%. Stop using the humidifier if you  notice moisture on windows, walls or pictures.  You do not need to add any medicine to the humidifier.  If you cannot get a humidifier, place a pan of water next to  heating vents and  refill the water level daily. The water will evaporate and add moisture to the  Room.    How to prevent the spread of colds  -Wash your hands with soap and water or use alcohol based hand   often. Dry hands wet from washing with soap on a paper towel instead of cloth towel.  -Cough or sneeze into your elbow to avoid spreading germs.  -Wipe down common surfaces, such as door knobs and faucet handles, with a disinfectant spray.  -Do not share cups or utensils.       -Flonase daily.  -Mucinex daily.  Please follow up with your Primary care provider within 2-5 days if your signs and symptoms have not resolved or worsen.     If your condition worsens or fails to improve we recommend that you receive another evaluation at the emergency room immediately or contact your primary medical clinic to discuss your concerns.   You must understand that you have received an Urgent Care treatment only and that you may be released before all of your medical problems are known or treated. You, the patient, will arrange for follow up care as instructed.

## 2019-01-08 NOTE — PATIENT INSTRUCTIONS
A cold is caused by a virus that can settle in your nose, throat or lungs. This causes  a runny or stuffy nose and sneezing. You may also have a sore throat, cough, headache, fever and muscle aches. Different cold viruses last different lengths  of time, but the average time is 2 to 14 days.    Seek immediate medical care if you develop fever, chest pain, or shortness of breath.     Treatment  There is no cure for the common cold.     Antibiotics may be used to treat signs of a secondary infection, but they do not treat  the cold virus. Try these tips to  keep yourself comfortable:  -Get plenty of rest.  -Drink plenty of fluids, at least 8 large glasses of fluid a day. Good fluidchoices are water, fruit juices high in Vitamin C, tea, gelatin, or broths and soups. These help to keep mucus thin and ease congestion.  -Use salt water gargle, cough drops or throat sprays to relieve throat pain. Mi ¼ to ½ teaspoon of salt in 1 cup of warm water for a salt water gargle  solution.  -Use petroleum jelly or lip balm around lips and nose to prevent chapping.  -Use saline nose drops or spray to help ease congestion.    Over the Counter (OTC) Medicines:  Take over the counter medicines as needed to ease your signs.  Read labels carefully.  Use a product that treats only the signs that you have. Ask your pharmacist  for recommendations. Be sure to ask about possible interactions with other  medicines you are taking.  Common medicines used to treat signs of a cold include:    #Antihistamines that dry secretions in your nose and lungs. Some of these  may cause you to feel drowsy. Talk to your pharmacist before use if you  have glaucoma or an enlarged prostate.  Names of some medicines in this group include:  - Diphenhydramine  - Brompheniramine  - Chlorpheniramine  - Clemastine    # Decongestants that tighten blood vessels in your nose to decrease  stuffiness and pressure. Use nasal spray decongestants for up to three days  only.  Longer use can make congestion worse. Talk to your pharmacist  before use if you have high blood pressure, heart disease, diabetes or an  enlarged prostate.    Names of some medicines in this group include:  - Pseudoephedrine (Sudafed)- kept behind the counter and requires identification  to purchase in limited quantities because it can be used to make illegal  drugs  - Phenylephrine  - Oxymetazoline nasal spray (Afrin)  - Cough suppressant, also called antitussive, such as dextromethorphan.  This medicine decreases your reflex and sensitivity to cough. This  medicine may be kept behind the pharmacy counter for purchase.  - Expectorant, sometimes called mucolytic, such as guaifenesin (mucinex). This  medicine thins mucus secretions in the lungs to make it easier for you to  cough up and out. (Be sure to drink plenty of fluids when taking this medication)  Cold and cough medicines often contain more than one type of medicine.  Ask the pharmacist for help to confirm that you are not using more than one  product with the same or similar ingredient. For example, some cold and  cough medicines have acetaminophen or ibuprofen in them to help lower a  fever or ease muscle aches. Do not take extra acetaminophen (Tylenol) or  ibuprofen (Advil, Motrin) if the cold or cough medicine has it as an  ingredient. Too much medicine could be harmful.    Take the correct dose as listed on the package. Do not take more than  recommended.    Use a Humidifier:  A cool mist humidifier can make breathing easier by thinning mucus. Do not use  a steam humidifier as hot water can cause burns if spilled.  Place the humidifier a few feet from the bed. Drain and clean each day with  soap and water to prevent bacteria and mold from growing.  Indoor humidity should not be above 50%. Stop using the humidifier if you  notice moisture on windows, walls or pictures.  You do not need to add any medicine to the humidifier.  If you cannot get a  humidifier, place a pan of water next to heating vents and  refill the water level daily. The water will evaporate and add moisture to the  Room.    How to prevent the spread of colds  -Wash your hands with soap and water or use alcohol based hand   often. Dry hands wet from washing with soap on a paper towel instead of cloth towel.  -Cough or sneeze into your elbow to avoid spreading germs.  -Wipe down common surfaces, such as door knobs and faucet handles, with a disinfectant spray.  -Do not share cups or utensils.       -Flonase daily.  -Mucinex daily.  Please follow up with your Primary care provider within 2-5 days if your signs and symptoms have not resolved or worsen.     If your condition worsens or fails to improve we recommend that you receive another evaluation at the emergency room immediately or contact your primary medical clinic to discuss your concerns.   You must understand that you have received an Urgent Care treatment only and that you may be released before all of your medical problems are known or treated. You, the patient, will arrange for follow up care as instructed.

## 2019-06-09 ENCOUNTER — OFFICE VISIT (OUTPATIENT)
Dept: URGENT CARE | Facility: CLINIC | Age: 59
End: 2019-06-09
Payer: COMMERCIAL

## 2019-06-09 VITALS
BODY MASS INDEX: 30.12 KG/M2 | TEMPERATURE: 98 F | HEART RATE: 73 BPM | OXYGEN SATURATION: 98 % | WEIGHT: 170 LBS | RESPIRATION RATE: 17 BRPM | SYSTOLIC BLOOD PRESSURE: 105 MMHG | DIASTOLIC BLOOD PRESSURE: 56 MMHG | HEIGHT: 63 IN

## 2019-06-09 DIAGNOSIS — R05.9 COUGH: ICD-10-CM

## 2019-06-09 DIAGNOSIS — R09.81 SINUS CONGESTION: ICD-10-CM

## 2019-06-09 DIAGNOSIS — J06.9 UPPER RESPIRATORY TRACT INFECTION, UNSPECIFIED TYPE: Primary | ICD-10-CM

## 2019-06-09 PROCEDURE — 3008F PR BODY MASS INDEX (BMI) DOCUMENTED: ICD-10-PCS | Mod: CPTII,S$GLB,, | Performed by: FAMILY MEDICINE

## 2019-06-09 PROCEDURE — 99213 PR OFFICE/OUTPT VISIT, EST, LEVL III, 20-29 MIN: ICD-10-PCS | Mod: S$GLB,,, | Performed by: FAMILY MEDICINE

## 2019-06-09 PROCEDURE — 3008F BODY MASS INDEX DOCD: CPT | Mod: CPTII,S$GLB,, | Performed by: FAMILY MEDICINE

## 2019-06-09 PROCEDURE — 99213 OFFICE O/P EST LOW 20 MIN: CPT | Mod: S$GLB,,, | Performed by: FAMILY MEDICINE

## 2019-06-09 NOTE — PROGRESS NOTES
"Subjective:       Patient ID: Claudia Martínez is a 59 y.o. female.    Vitals:  height is 5' 3" (1.6 m) and weight is 77.1 kg (170 lb). Her oral temperature is 98 °F (36.7 °C). Her blood pressure is 105/56 (abnormal) and her pulse is 73. Her respiration is 17 and oxygen saturation is 98%.     Chief Complaint: Sinus Problem    Sx began Wednesday.    Sinus Problem   This is a new problem. The current episode started in the past 7 days. The problem has been gradually improving since onset. There has been no fever. Her pain is at a severity of 5/10. She is experiencing no pain. Associated symptoms include congestion, coughing and a hoarse voice. Pertinent negatives include no chills, diaphoresis, ear pain, headaches, neck pain, shortness of breath, sinus pressure, sneezing, sore throat or swollen glands. Past treatments include oral decongestants. The treatment provided mild relief.       Constitution: Negative for chills, sweating, fatigue and fever.   HENT: Positive for congestion. Negative for ear pain, sinus pain, sinus pressure, sore throat and voice change.    Neck: Negative for neck pain and painful lymph nodes.   Eyes: Negative for eye redness.   Respiratory: Positive for cough and sputum production. Negative for chest tightness, bloody sputum, COPD, shortness of breath, stridor, wheezing and asthma.    Gastrointestinal: Negative for nausea and vomiting.   Musculoskeletal: Negative for muscle ache.   Skin: Negative for rash and erythema.   Allergic/Immunologic: Positive for seasonal allergies. Negative for asthma and sneezing.   Neurological: Negative for headaches.   Hematologic/Lymphatic: Negative for swollen lymph nodes.       Objective:      Physical Exam   Constitutional: She is oriented to person, place, and time. She appears well-developed and well-nourished.   HENT:   Head: Normocephalic and atraumatic.   Right Ear: External ear normal.   Left Ear: External ear normal.   Mouth/Throat: Oropharynx is clear and " moist.   Eyes: Pupils are equal, round, and reactive to light. EOM are normal.   Neck: Normal range of motion. Neck supple.   Cardiovascular: Normal rate, regular rhythm and normal heart sounds. Exam reveals no gallop and no friction rub.   No murmur heard.  Pulmonary/Chest: Breath sounds normal. No respiratory distress. She has no wheezes. She has no rales.   Abdominal: Soft. Bowel sounds are normal. She exhibits no distension. There is no tenderness.   Musculoskeletal: Normal range of motion. She exhibits no edema, tenderness or deformity.   Lymphadenopathy:     She has no cervical adenopathy.   Neurological: She is alert and oriented to person, place, and time. No cranial nerve deficit. She exhibits normal muscle tone. Coordination normal.   Skin: Skin is warm. Capillary refill takes less than 2 seconds. No rash noted. No erythema. No pallor.   Psychiatric: She has a normal mood and affect. Her behavior is normal. Judgment and thought content normal.   Vitals reviewed.      Assessment:       1. Upper respiratory tract infection, unspecified type    2. Sinus congestion    3. Cough        Plan:         Upper respiratory tract infection, unspecified type    Sinus congestion    Cough          Patient Instructions     Understanding Sinus Problems    You dont often think about your sinuses until theres a problem. One day you realize you cant smell dinner cooking. Or you find you often have headaches or problems breathing through your nose.  Symptoms of sinus problems  Sinus problems can cause uncomfortable symptoms. Your nose may run constantly. You might have trouble sleeping at night. You may even lose your sense of smell. Other symptoms can include:  · Nasal congestion  · Fullness in ears  · Green, yellow, or bloody drainage from the nose  · Trouble tasting food  · Frequent headaches  · Facial pain  · Cough  When sinuses are blocked  If something blocks the passages in the nose or sinuses, mucus cant drain.  Mucus-filled sinuses often become infected.  · Colds cause the lining of the nose and sinuses to swell and make extra mucus. A buildup of mucus can lead to a more serious infection.  · Allergies irritate turbinates and other tissues. This causes swelling, which can cause a blockage. Over time, this irritation can also lead to sacs of swollen tissue (polyps).  · Polyps may form in both the sinuses and nose. Polyps can grow large enough to clog nasal passages and block drainage.  · A crooked (deviated) septum may block nasal passages. This is often the result of an injury.  Date Last Reviewed: 11/1/2016  © 0416-5367 [employer]. [address] [place] [address], [place] [address]. All rights reserved. This information is not intended as a substitute for professional medical care. Always follow your healthcare professional's instructions.    Flonase and mucinex over the counter as needed as directed.  Follow up with your doctor in a few days as needed.  Return to the urgent care or go to the ER if symptoms get worse.    Wilder Veliz MD

## 2019-06-09 NOTE — PATIENT INSTRUCTIONS
Understanding Sinus Problems    You dont often think about your sinuses until theres a problem. One day you realize you cant smell dinner cooking. Or you find you often have headaches or problems breathing through your nose.  Symptoms of sinus problems  Sinus problems can cause uncomfortable symptoms. Your nose may run constantly. You might have trouble sleeping at night. You may even lose your sense of smell. Other symptoms can include:  · Nasal congestion  · Fullness in ears  · Green, yellow, or bloody drainage from the nose  · Trouble tasting food  · Frequent headaches  · Facial pain  · Cough  When sinuses are blocked  If something blocks the passages in the nose or sinuses, mucus cant drain. Mucus-filled sinuses often become infected.  · Colds cause the lining of the nose and sinuses to swell and make extra mucus. A buildup of mucus can lead to a more serious infection.  · Allergies irritate turbinates and other tissues. This causes swelling, which can cause a blockage. Over time, this irritation can also lead to sacs of swollen tissue (polyps).  · Polyps may form in both the sinuses and nose. Polyps can grow large enough to clog nasal passages and block drainage.  · A crooked (deviated) septum may block nasal passages. This is often the result of an injury.  Date Last Reviewed: 11/1/2016  © 9209-8800 [employer]. [address] [place] [address], [place] [address]. All rights reserved. This information is not intended as a substitute for professional medical care. Always follow your healthcare professional's instructions.    Flonase and mucinex over the counter as needed as directed.  Follow up with your doctor in a few days as needed.  Return to the urgent care or go to the ER if symptoms get worse.    Wilder Veliz MD

## 2019-06-10 RX ORDER — METHYLPREDNISOLONE 4 MG/1
TABLET ORAL
Qty: 1 PACKAGE | Refills: 0 | Status: SHIPPED | OUTPATIENT
Start: 2019-06-10 | End: 2019-06-16

## 2019-10-26 ENCOUNTER — OFFICE VISIT (OUTPATIENT)
Dept: URGENT CARE | Facility: CLINIC | Age: 59
End: 2019-10-26
Payer: COMMERCIAL

## 2019-10-26 VITALS
TEMPERATURE: 99 F | DIASTOLIC BLOOD PRESSURE: 63 MMHG | HEART RATE: 79 BPM | SYSTOLIC BLOOD PRESSURE: 107 MMHG | BODY MASS INDEX: 24.8 KG/M2 | RESPIRATION RATE: 17 BRPM | HEIGHT: 63 IN | WEIGHT: 140 LBS | OXYGEN SATURATION: 98 %

## 2019-10-26 DIAGNOSIS — R09.81 NASAL SINUS CONGESTION: ICD-10-CM

## 2019-10-26 DIAGNOSIS — H93.8X3 CONGESTION OF BOTH EARS: ICD-10-CM

## 2019-10-26 DIAGNOSIS — J30.9 ALLERGIC RHINITIS, UNSPECIFIED SEASONALITY, UNSPECIFIED TRIGGER: Primary | ICD-10-CM

## 2019-10-26 PROCEDURE — 99214 PR OFFICE/OUTPT VISIT, EST, LEVL IV, 30-39 MIN: ICD-10-PCS | Mod: S$GLB,,, | Performed by: NURSE PRACTITIONER

## 2019-10-26 PROCEDURE — 3008F PR BODY MASS INDEX (BMI) DOCUMENTED: ICD-10-PCS | Mod: CPTII,S$GLB,, | Performed by: NURSE PRACTITIONER

## 2019-10-26 PROCEDURE — 3008F BODY MASS INDEX DOCD: CPT | Mod: CPTII,S$GLB,, | Performed by: NURSE PRACTITIONER

## 2019-10-26 PROCEDURE — 99214 OFFICE O/P EST MOD 30 MIN: CPT | Mod: S$GLB,,, | Performed by: NURSE PRACTITIONER

## 2019-10-26 RX ORDER — TAZAROTENE 0.05 MG/G
CREAM CUTANEOUS
COMMUNITY
Start: 2019-10-21

## 2019-10-26 NOTE — PATIENT INSTRUCTIONS
Return to Urgent Care or go to ER if symptoms worsen or fail to improve.  Follow up with PCP as recommended for further management.     Over the counter medications that are also available by prescription (depending on insurance coverage):    Use Flonase 1-2 sprays/nostril per day. It is a local acting steroid nasal spray, if you develop a bloody nose, stop using the medication immediately.    Over the counter medications that may be helpful:    Use Afrin in each nare for no longer than 5 days, as it is addictive. It can also dry out your mucous membranes and cause elevated blood pressure.    Use pseudoephedrine (behind the counter) to decongest-- (the little red pills).  Pseudoephedrine 30 mg up to 240 mg /day. It can raise your blood pressure and give you palpitations.  Do not buy any oral medications with phenylephrine as it has not been proven effective as a decongestant at the OTC dose.     Take Ibuprofen or Acetaminophen according to label instructions for fever, throat pain, headache, and body aches    Use warm salt water gargles to ease your throat pain. Warm salt water gargles as needed for sore throat-  1/2 tsp salt to 1 cup warm water, gargle as desired.    Sometimes Nyquil at night is beneficial to help you get some rest, however it is sedating and does have an antihistamine, as well as tylenol.  The Nyquil behind the pharmacy counter also has the decongestant, pseudoephedrine as an additional ingredient.

## 2019-10-26 NOTE — PROGRESS NOTES
"Subjective:       Patient ID: Claudia Martínez is a 59 y.o. female.    Vitals:  height is 5' 3" (1.6 m) and weight is 63.5 kg (140 lb). Her oral temperature is 98.5 °F (36.9 °C). Her blood pressure is 107/63 and her pulse is 79. Her respiration is 17 and oxygen saturation is 98%.     Chief Complaint: Sinus Problem    This is a 59 y.o. female who presents today with a chief complaint of   Sinus congestion, ear pressure. Sx started yesterday taking Mucinex, and Flonase mild relief     Sinus Problem   This is a new problem. The current episode started yesterday. The problem is unchanged. There has been no fever. She is experiencing no pain. Associated symptoms include congestion, coughing and sinus pressure. Pertinent negatives include no chills, diaphoresis, ear pain, shortness of breath or sore throat. Past treatments include oral decongestants (mucinex). The treatment provided mild relief.       Constitution: Negative for chills, sweating, fatigue and fever.   HENT: Positive for congestion, postnasal drip and sinus pressure. Negative for ear pain, sinus pain, sore throat and voice change.    Neck: Negative for painful lymph nodes.   Eyes: Negative for eye redness.   Respiratory: Positive for cough. Negative for chest tightness, sputum production, bloody sputum, COPD, shortness of breath, stridor, wheezing and asthma.    Gastrointestinal: Negative for nausea and vomiting.   Musculoskeletal: Negative for muscle ache.   Skin: Negative for rash.   Allergic/Immunologic: Negative for seasonal allergies and asthma.   Hematologic/Lymphatic: Negative for swollen lymph nodes.       Objective:      Physical Exam   Constitutional: She is oriented to person, place, and time. She appears well-developed and well-nourished. She is cooperative.  Non-toxic appearance. She does not have a sickly appearance. She does not appear ill. No distress.   HENT:   Head: Normocephalic and atraumatic.   Right Ear: Hearing, external ear and ear canal " normal. Tympanic membrane is bulging. Tympanic membrane is not erythematous.   Left Ear: Hearing, external ear and ear canal normal. Tympanic membrane is bulging. Tympanic membrane is not erythematous.   Nose: Mucosal edema and rhinorrhea present. No purulent discharge or nasal deformity. No epistaxis. Right sinus exhibits no maxillary sinus tenderness and no frontal sinus tenderness. Left sinus exhibits no maxillary sinus tenderness and no frontal sinus tenderness.   Mouth/Throat: Uvula is midline, oropharynx is clear and moist and mucous membranes are normal. No trismus in the jaw. Normal dentition. No uvula swelling. No oropharyngeal exudate, posterior oropharyngeal edema or posterior oropharyngeal erythema.   Eyes: Conjunctivae and lids are normal. No scleral icterus.   Neck: Trachea normal, full passive range of motion without pain and phonation normal. Neck supple. No neck rigidity. No edema and no erythema present.   Cardiovascular: Normal rate, regular rhythm, normal heart sounds, intact distal pulses and normal pulses.   Pulmonary/Chest: Effort normal and breath sounds normal. No respiratory distress. She has no decreased breath sounds. She has no wheezes. She has no rhonchi.   Abdominal: Normal appearance.   Musculoskeletal: Normal range of motion. She exhibits no edema or deformity.   Neurological: She is alert and oriented to person, place, and time. She exhibits normal muscle tone. Coordination normal.   Skin: Skin is warm, dry, intact, not diaphoretic, not pale and no rash.   Psychiatric: She has a normal mood and affect. Her speech is normal and behavior is normal. Judgment and thought content normal. Cognition and memory are normal.   Nursing note and vitals reviewed.        Assessment:       1. Allergic rhinitis, unspecified seasonality, unspecified trigger    2. Nasal sinus congestion    3. Congestion of both ears        Plan:         Allergic rhinitis, unspecified seasonality, unspecified  trigger    Nasal sinus congestion    Congestion of both ears

## 2023-06-21 NOTE — LETTER
January 8, 2019      [facility] [place]  [address]  [place] [address]  Phone: [phone]  Fax: [phone]       Patient: Claudia Martínez   YOB: 1960  Date of Visit: 01/08/2019    To Whom It May Concern:    Marissa Martínez  was at [facility] on 01/08/2019. She may return to work/school on 01/08/19 with no restrictions. If you have any questions or concerns, or if I can be of further assistance, please do not hesitate to contact me.    Sincerely,        Trista Coombs, NP      forehead